# Patient Record
Sex: FEMALE | Race: WHITE | URBAN - METROPOLITAN AREA
[De-identification: names, ages, dates, MRNs, and addresses within clinical notes are randomized per-mention and may not be internally consistent; named-entity substitution may affect disease eponyms.]

---

## 2018-12-11 ENCOUNTER — OFFICE VISIT (OUTPATIENT)
Dept: URBAN - METROPOLITAN AREA CLINIC 33 | Facility: CLINIC | Age: 78
End: 2018-12-11
Payer: MEDICARE

## 2018-12-11 PROCEDURE — 99214 OFFICE O/P EST MOD 30 MIN: CPT | Performed by: OPHTHALMOLOGY

## 2018-12-11 PROCEDURE — 92134 CPTRZ OPH DX IMG PST SGM RTA: CPT | Performed by: OPHTHALMOLOGY

## 2018-12-11 ASSESSMENT — INTRAOCULAR PRESSURE
OD: 16
OS: 15

## 2018-12-11 NOTE — IMPRESSION/PLAN
Impression: Diagnosis: Primary open-angle glaucoma, bilateral, indeterminate stage. Code: G23.6664. Plan: Discussed diagnosis with patient. Recommend appt W/ Dr Waqas Osborn or  Dr Sheri Best. Continue gtts as prescribed.

## 2018-12-11 NOTE — IMPRESSION/PLAN
Impression: Tributary (branch) retinal vein occlusion, left eye, stable: O73.5937.
s/p Eylea Plan: OCT ordered and performed today. The patient returns today for an evaluation of Branch retinal vein occlusion. The vision remains stable exam and OCT test show no evidence of edema or neovascularization  at this time we will continue to observe the patient was advised to work closely with PCP to control blood pressure and lipids.

## 2018-12-28 ENCOUNTER — OFFICE VISIT (OUTPATIENT)
Dept: URBAN - METROPOLITAN AREA CLINIC 29 | Facility: CLINIC | Age: 78
End: 2018-12-28
Payer: MEDICARE

## 2018-12-28 DIAGNOSIS — H40.1134 PRIMARY OPEN-ANGLE GLAUCOMA, BILATERAL, INDETERMINATE STAGE: Primary | ICD-10-CM

## 2018-12-28 PROCEDURE — 76514 ECHO EXAM OF EYE THICKNESS: CPT | Performed by: OPHTHALMOLOGY

## 2018-12-28 PROCEDURE — 92133 CPTRZD OPH DX IMG PST SGM ON: CPT | Performed by: OPHTHALMOLOGY

## 2018-12-28 PROCEDURE — 92020 GONIOSCOPY: CPT | Performed by: OPHTHALMOLOGY

## 2018-12-28 PROCEDURE — 92014 COMPRE OPH EXAM EST PT 1/>: CPT | Performed by: OPHTHALMOLOGY

## 2018-12-28 RX ORDER — NETARSUDIL 0.2 MG/ML
0.02 % SOLUTION/ DROPS OPHTHALMIC; TOPICAL
Qty: 1 | Refills: 11 | Status: INACTIVE
Start: 2018-12-28 | End: 2019-01-29

## 2018-12-28 RX ORDER — BIMATOPROST 0.1 MG/ML
0.01 % SOLUTION/ DROPS OPHTHALMIC
Qty: 1 | Refills: 11 | Status: INACTIVE
Start: 2018-12-28 | End: 2018-12-28

## 2018-12-28 ASSESSMENT — INTRAOCULAR PRESSURE
OD: 23
OS: 18

## 2018-12-28 NOTE — IMPRESSION/PLAN
Impression: Primary open-angle glaucoma, bilateral, indeterminate stage: H40.1134. Plan: Discussed diagnosis, explained and understood by patient. Discussed IOP/ONH/Glaucoma management and risks. OCT ordered, performed and reviewed. Start Rhopressa qhs os, sample given. Discussed side effects of glaucoma meds. Continue dorzolamide bid ou, lumigan qhs ou, and brimonidine tid os. Will continue to monitor condition and symptoms.

## 2019-01-29 ENCOUNTER — OFFICE VISIT (OUTPATIENT)
Dept: URBAN - METROPOLITAN AREA CLINIC 29 | Facility: CLINIC | Age: 79
End: 2019-01-29
Payer: MEDICARE

## 2019-01-29 PROCEDURE — 92083 EXTENDED VISUAL FIELD XM: CPT | Performed by: OPHTHALMOLOGY

## 2019-01-29 PROCEDURE — 99213 OFFICE O/P EST LOW 20 MIN: CPT | Performed by: OPHTHALMOLOGY

## 2019-01-29 RX ORDER — DORZOLAMIDE HCL 20 MG/ML
2 % SOLUTION/ DROPS OPHTHALMIC
Qty: 0 | Refills: 0 | Status: INACTIVE
Start: 2019-01-29 | End: 2019-01-29

## 2019-01-29 RX ORDER — NETARSUDIL 0.2 MG/ML
0.02 % SOLUTION/ DROPS OPHTHALMIC; TOPICAL
Qty: 1 | Refills: 11 | Status: INACTIVE
Start: 2019-01-29 | End: 2019-02-12

## 2019-01-29 RX ORDER — BRIMONIDINE TARTRATE 2 MG/ML
0.2 % SOLUTION/ DROPS OPHTHALMIC
Qty: 1 | Refills: 11 | Status: INACTIVE
Start: 2019-01-29 | End: 2019-03-21

## 2019-01-29 RX ORDER — BIMATOPROST 0.1 MG/ML
0.01 % SOLUTION/ DROPS OPHTHALMIC
Refills: 0 | Status: INACTIVE
Start: 2019-01-29 | End: 2019-01-29

## 2019-01-29 RX ORDER — BIMATOPROST 0.1 MG/ML
0.01 % SOLUTION/ DROPS OPHTHALMIC
Qty: 1 | Refills: 11 | Status: INACTIVE
Start: 2019-01-29 | End: 2020-02-05

## 2019-01-29 RX ORDER — DORZOLAMIDE HCL 20 MG/ML
2 % SOLUTION/ DROPS OPHTHALMIC
Qty: 1 | Refills: 11 | Status: ACTIVE
Start: 2019-01-29

## 2019-01-29 ASSESSMENT — INTRAOCULAR PRESSURE
OD: 20
OS: 17

## 2019-01-29 NOTE — IMPRESSION/PLAN
Impression: Primary open-angle glaucoma, left eye, severe stage: Y10.1665. CCT average OU-ONH stable ou-
IOP borderline ou - Plan: Discussed diagnosis, explained and understood by patient. Discussed IOP/ONH/Glaucoma management and risks. Visual field ordered and reviewed today. Continue rhopressa qhs od, lumigan qhs ou, dorzolamide bid ou, brimonidine tid os. Will continue to monitor condition and symptoms.

## 2019-03-21 ENCOUNTER — OFFICE VISIT (OUTPATIENT)
Dept: URBAN - METROPOLITAN AREA CLINIC 29 | Facility: CLINIC | Age: 79
End: 2019-03-21
Payer: MEDICARE

## 2019-03-21 DIAGNOSIS — T15.11XA FOREIGN BODY IN CONJUNCTIVAL SAC, RIGHT EYE, INIT ENCNTR: ICD-10-CM

## 2019-03-21 DIAGNOSIS — H02.811 RETAINED FB IN RIGHT UPPER EYELID: Primary | ICD-10-CM

## 2019-03-21 PROCEDURE — 65205 REMOVE FOREIGN BODY FROM EYE: CPT | Performed by: OPTOMETRIST

## 2019-03-21 PROCEDURE — 99213 OFFICE O/P EST LOW 20 MIN: CPT | Performed by: OPTOMETRIST

## 2019-03-21 NOTE — IMPRESSION/PLAN
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 OD. Plan: Discussed diagnosis in detail with patient. Discussed treatment options with patient. Surgical treatment is required. Patient elects to have surgery. Will proceed with surgical treatment today.

## 2019-09-13 ENCOUNTER — HOSPITAL ENCOUNTER (OUTPATIENT)
Dept: ULTRASOUND IMAGING | Facility: OTHER | Age: 79
Discharge: HOME | End: 2019-09-13
Attending: NURSE PRACTITIONER | Admitting: FAMILY MEDICINE
Payer: MEDICARE

## 2019-09-13 DIAGNOSIS — I65.23 CAROTID ARTERY STENOSIS, ASYMPTOMATIC, BILATERAL: ICD-10-CM

## 2019-09-13 PROCEDURE — 93880 EXTRACRANIAL BILAT STUDY: CPT

## 2019-11-12 ENCOUNTER — OFFICE VISIT (OUTPATIENT)
Dept: URBAN - METROPOLITAN AREA CLINIC 29 | Facility: CLINIC | Age: 79
End: 2019-11-12
Payer: MEDICARE

## 2019-11-12 PROCEDURE — 92133 CPTRZD OPH DX IMG PST SGM ON: CPT | Performed by: OPHTHALMOLOGY

## 2019-11-12 PROCEDURE — 99213 OFFICE O/P EST LOW 20 MIN: CPT | Performed by: OPHTHALMOLOGY

## 2019-11-12 ASSESSMENT — INTRAOCULAR PRESSURE
OD: 14
OS: 14

## 2019-11-12 NOTE — IMPRESSION/PLAN
Impression: Primary open-angle glaucoma, left eye, severe stage: P78.9529. CCT average OU ONH stable ou
IOP borderline ou Plan: Discussed diagnosis, explained and understood by patient. Discussed IOP/ONH/Glaucoma management and risks. OCT ordered and reviewed today. Continue rhopressa qhs ou lumigan qhs ou, dorzolamide bid ou, brimonidine tid ou. Will continue to monitor condition and symptoms.

## 2019-12-17 ENCOUNTER — OFFICE VISIT (OUTPATIENT)
Dept: URBAN - METROPOLITAN AREA CLINIC 33 | Facility: CLINIC | Age: 79
End: 2019-12-17
Payer: MEDICARE

## 2019-12-17 DIAGNOSIS — H34.8322 TRIBUTARY (BRANCH) RETINAL VEIN OCCLUSION, LEFT EYE, STABLE: Primary | ICD-10-CM

## 2019-12-17 PROCEDURE — 92134 CPTRZ OPH DX IMG PST SGM RTA: CPT | Performed by: OPHTHALMOLOGY

## 2019-12-17 PROCEDURE — 99213 OFFICE O/P EST LOW 20 MIN: CPT | Performed by: OPHTHALMOLOGY

## 2019-12-17 ASSESSMENT — INTRAOCULAR PRESSURE
OS: 17
OD: 16

## 2019-12-17 NOTE — IMPRESSION/PLAN
Impression: Lila Steinberg (branch) retinal vein occlusion, left eye, stable: K11.1585.
s/p Eylea 09/2019 in [place] Plan: OCT ordered and performed today. The patient returns today for an evaluation of Branch retinal vein occlusion. The vision remains stable exam and OCT test show no evidence of edema or neovascularization  at this time we will continue to observe the patient was advised to work closely with PCP to control blood pressure and lipids.

## 2019-12-17 NOTE — IMPRESSION/PLAN
Impression: Primary open-angle glaucoma, left eye, severe stage: X53.2485. Plan: Discussed diagnosis in detail with patient. Discussed treatment options with patient. Continue gtts as directed by Dr. Anita Light.

## 2020-03-04 ENCOUNTER — TESTING ONLY (OUTPATIENT)
Dept: URBAN - METROPOLITAN AREA CLINIC 29 | Facility: CLINIC | Age: 80
End: 2020-03-04
Payer: MEDICARE

## 2020-03-04 PROCEDURE — 92083 EXTENDED VISUAL FIELD XM: CPT | Performed by: OPHTHALMOLOGY

## 2020-03-06 ENCOUNTER — OFFICE VISIT (OUTPATIENT)
Dept: URBAN - METROPOLITAN AREA CLINIC 29 | Facility: CLINIC | Age: 80
End: 2020-03-06
Payer: MEDICARE

## 2020-03-06 PROCEDURE — 99213 OFFICE O/P EST LOW 20 MIN: CPT | Performed by: OPHTHALMOLOGY

## 2020-03-06 RX ORDER — BIMATOPROST 0.1 MG/ML
0.01 % SOLUTION/ DROPS OPHTHALMIC
Qty: 5 | Refills: 11 | Status: INACTIVE
Start: 2020-03-06 | End: 2020-04-21

## 2020-03-06 ASSESSMENT — INTRAOCULAR PRESSURE
OD: 27
OS: 26

## 2020-03-06 NOTE — IMPRESSION/PLAN
Impression: Primary open-angle glaucoma, left eye, severe stage: V78.1074. CCT average OU ONH stable ou
IOP elevated OU (Pt stopped using lumigan) Plan: Discussed diagnosis, explained and understood by patient. Discussed IOP/ONH/Glaucoma management and risks. VF reviewed today. Continue rhopressa qhs ou dorzolamide bid ou, brimonidine tid ou. Restart lumigan qhs ou (IOP elevated OU, patient had stopped lumigan). Will continue to monitor condition and symptoms.

## 2020-03-31 ENCOUNTER — OFFICE VISIT (OUTPATIENT)
Dept: URBAN - METROPOLITAN AREA CLINIC 23 | Facility: CLINIC | Age: 80
End: 2020-03-31
Payer: MEDICARE

## 2020-03-31 PROCEDURE — 92012 INTRM OPH EXAM EST PATIENT: CPT | Performed by: OPHTHALMOLOGY

## 2020-03-31 ASSESSMENT — INTRAOCULAR PRESSURE
OS: 24
OD: 23

## 2020-03-31 NOTE — IMPRESSION/PLAN
Impression: Primary open-angle glaucoma, left eye, severe stage: L78.6853. BRVO OS Plan: Pt has Glaucoma    Gonio :  No gonio      Pachs:546/547      Today's IOP :  23, 24        // Tmax & date :27/38 Target IOP low to mid teens Pt denies Fhx of Glaucoma Right eye is the better seeing eye HVF (03/04/2020) OD: Non pattern defect OS: Inferior Nasal Step3/4/2020 OCT: (11/12/19) 78, 62 Pt denies Sulfa Allergy   // Pt denies Lung /Heart dx Pt is currently using : rhopressa qhs ou dorzolamide bid ou, brimonidine tid ou. Restart lumigan qhs ou No previous medications used  / Previously used medications : 
Plan :
1. Cont:
rhopressa qhs ou 
dorzolamide bid ou
brimonidine tid ou
lumigan qhs ou 2. Patient's IOP is slightly elevated today ; will continue to observe 3.  Return in 3 weeks for IOP check , if IOP is still elevated may warrant SLT OU

## 2020-03-31 NOTE — IMPRESSION/PLAN
Impression: Primary open-angle glaucoma, right eye, moderate stage: H40.1112.  Plan: See assessment #1

## 2020-04-21 ENCOUNTER — OFFICE VISIT (OUTPATIENT)
Dept: URBAN - METROPOLITAN AREA CLINIC 23 | Facility: CLINIC | Age: 80
End: 2020-04-21
Payer: MEDICARE

## 2020-04-21 PROCEDURE — 92014 COMPRE OPH EXAM EST PT 1/>: CPT | Performed by: OPHTHALMOLOGY

## 2020-04-21 RX ORDER — NETARSUDIL 0.2 MG/ML
0.02 % SOLUTION/ DROPS OPHTHALMIC; TOPICAL
Qty: 3 | Refills: 3 | Status: INACTIVE
Start: 2020-04-21 | End: 2021-05-12

## 2020-04-21 RX ORDER — BIMATOPROST 0.1 MG/ML
0.01 % SOLUTION/ DROPS OPHTHALMIC
Qty: 3 | Refills: 3 | Status: INACTIVE
Start: 2020-04-21 | End: 2021-01-06

## 2020-04-21 ASSESSMENT — INTRAOCULAR PRESSURE
OS: 24
OD: 20

## 2020-04-21 NOTE — IMPRESSION/PLAN
Impression: Primary open-angle glaucoma, left eye, severe stage: K20.9508. BRVO OS Plan: Pt has Glaucoma    Gonio :  No gonio      Pachs:546/547      Today's IOP :   20, 24      // Tmax & date :27/38 Target IOP low to mid teens Pt denies Fhx of Glaucoma Right eye is the better seeing eye HVF (03/04/2020) OD: Non pattern defect OS: Inferior Nasal Step3/4/2020 OCT: (11/12/19) 78, 62 Pt denies Sulfa Allergy   // Pt denies Lung /Heart dx Pt is currently using : rhopressa qhs ou dorzolamide bid ou, brimonidine tid ou. Restart lumigan qhs ou No previous medications used Plan :
1. Cont:
Rhopressa qhs ou 
dorzolamide bid ou
brimonidine tid ou Lumigan qhs ou 2. Patient's IOP is still elevated upon today's exam, recommend SLT OU in [place]. The patient is aware of the limitations of SLT , which can lower IOP 2-4mm for 6--12 months. The Patient is aware that SLT cannot improve the vision nor eliminate the need for the topical medications. If on any glaucoma medications, the patient is aware that SLT is not a replacement for the current medical regimen. 
3. Schedule SLT OD then OS in [place] and then return in the fall - October 2020

## 2020-07-07 ENCOUNTER — HOSPITAL ENCOUNTER (EMERGENCY)
Facility: OTHER | Age: 80
Discharge: HOME | End: 2020-07-07
Attending: EMERGENCY MEDICINE | Admitting: EMERGENCY MEDICINE
Payer: MEDICARE

## 2020-07-07 ENCOUNTER — APPOINTMENT (OUTPATIENT)
Dept: CT IMAGING | Facility: OTHER | Age: 80
End: 2020-07-07
Attending: EMERGENCY MEDICINE
Payer: MEDICARE

## 2020-07-07 VITALS
WEIGHT: 168 LBS | TEMPERATURE: 97.8 F | OXYGEN SATURATION: 98 % | DIASTOLIC BLOOD PRESSURE: 80 MMHG | RESPIRATION RATE: 16 BRPM | SYSTOLIC BLOOD PRESSURE: 195 MMHG

## 2020-07-07 DIAGNOSIS — I10 BENIGN ESSENTIAL HYPERTENSION: ICD-10-CM

## 2020-07-07 PROCEDURE — 99284 EMERGENCY DEPT VISIT MOD MDM: CPT | Mod: 25 | Performed by: EMERGENCY MEDICINE

## 2020-07-07 PROCEDURE — 93010 ELECTROCARDIOGRAM REPORT: CPT | Performed by: INTERNAL MEDICINE

## 2020-07-07 PROCEDURE — 99284 EMERGENCY DEPT VISIT MOD MDM: CPT | Mod: Z6 | Performed by: EMERGENCY MEDICINE

## 2020-07-07 PROCEDURE — 70450 CT HEAD/BRAIN W/O DYE: CPT

## 2020-07-07 PROCEDURE — 93005 ELECTROCARDIOGRAM TRACING: CPT | Performed by: EMERGENCY MEDICINE

## 2020-07-07 RX ORDER — NAPROXEN 500 MG/1
500 TABLET ORAL
COMMUNITY

## 2020-07-07 RX ORDER — ASPIRIN 325 MG
325 TABLET ORAL
COMMUNITY

## 2020-07-07 RX ORDER — METOPROLOL SUCCINATE 100 MG/1
TABLET, EXTENDED RELEASE ORAL
COMMUNITY
Start: 2019-11-23

## 2020-07-07 RX ORDER — LISINOPRIL 5 MG/1
5 TABLET ORAL DAILY
Qty: 14 TABLET | Refills: 0 | Status: SHIPPED | OUTPATIENT
Start: 2020-07-07

## 2020-07-07 RX ORDER — BRIMONIDINE TARTRATE 2 MG/ML
1 SOLUTION/ DROPS OPHTHALMIC
COMMUNITY

## 2020-07-07 RX ORDER — DORZOLAMIDE HCL 20 MG/ML
1 SOLUTION/ DROPS OPHTHALMIC
COMMUNITY

## 2020-07-07 RX ORDER — ALENDRONATE SODIUM 70 MG/1
TABLET ORAL
COMMUNITY
Start: 2020-06-30

## 2020-07-07 RX ORDER — LOSARTAN POTASSIUM 100 MG/1
TABLET ORAL
COMMUNITY
Start: 2019-12-26

## 2020-07-07 RX ORDER — PRAVASTATIN SODIUM 40 MG
TABLET ORAL
COMMUNITY
Start: 2019-09-23

## 2020-07-07 RX ORDER — LISINOPRIL 5 MG/1
5 TABLET ORAL DAILY
Qty: 14 TABLET | Refills: 0 | Status: SHIPPED | OUTPATIENT
Start: 2020-07-07 | End: 2020-07-07

## 2020-07-07 RX ORDER — GABAPENTIN 300 MG/1
1200 CAPSULE ORAL
COMMUNITY
Start: 2020-06-30

## 2020-07-07 RX ORDER — CHOLECALCIFEROL (VITAMIN D3) 50 MCG
2000 TABLET ORAL
COMMUNITY

## 2020-07-07 RX ORDER — TIMOLOL MALEATE/LATANOPROST/PF 0.5-0.005%
DROPS OPHTHALMIC (EYE)
COMMUNITY

## 2020-07-07 RX ORDER — LANOLIN ALCOHOL/MO/W.PET/CERES
3 CREAM (GRAM) TOPICAL
COMMUNITY

## 2020-07-07 RX ORDER — FUROSEMIDE 40 MG
40 TABLET ORAL
COMMUNITY

## 2020-07-07 RX ORDER — METFORMIN HCL 500 MG
TABLET, EXTENDED RELEASE 24 HR ORAL
COMMUNITY
Start: 2020-06-30

## 2020-07-07 RX ORDER — OMEPRAZOLE 40 MG/1
CAPSULE, DELAYED RELEASE ORAL
COMMUNITY
Start: 2019-08-17

## 2020-07-07 NOTE — ED TRIAGE NOTES
Pt states that yesterday and today she had a headache so she checked her BP and it was 180-190's systolic.  Pt was seen at the clinic and told her to come to the ED for imaging.  The clinic also had blood drawn on the pt.

## 2020-07-07 NOTE — ED PROVIDER NOTES
[facility]  Emergency Department Visit Note    Hypertension and Headache      History of Present Illness     HPI:  Coni Dietz is a 79 year old female presenting with hypertension. The patient has a history of hypertension but noted increased blood pressure that was concerning to her over the last 2 days. The patient has been compliant with all prescribed medications for hypertension. She has no chest pain, shortness of breath, abdominal pain, headache, blurry vision, diplopia, nausea, vomiting, dysuria, hematuria. She has had a mild frontal headache which is unusual and it prompted her to check her BP.  She went to her clinic and they recommended she go to the emergency department for imaging of her head.    Medications:  Prior to Admission medications    Medication Sig Last Dose Taking? Auth Provider   ACCU-CHEK JLUIS VI STRP 2 to 3 times a day or as directed 7/7/2020 at Unknown time Yes David Crocker MD   alendronate (FOSAMAX) 70 MG tablet TAKE 1 TABLET ONE TIME A WEEK. TAKE WITH PLAIN WATER IN THE MORNING. 7/7/2020 at Unknown time Yes Reported, Patient   aspirin (ASA) 325 MG tablet Take 325 mg by mouth 7/7/2020 at Unknown time Yes Reported, Patient   brimonidine (ALPHAGAN) 0.2 % ophthalmic solution 1 drop 7/7/2020 at Unknown time Yes Reported, Patient   dorzolamide (TRUSOPT) 2 % ophthalmic solution Inject 1 drop into the eye 7/7/2020 at Unknown time Yes Reported, Patient   FENOFIBRATE 134 MG OR CAPS 1 CAPSULE DAILY WITH FOOD 7/7/2020 at Unknown time Yes David Crocker MD   furosemide (LASIX) 40 MG tablet Take 40 mg by mouth 7/7/2020 at Unknown time Yes Reported, Patient   gabapentin (NEURONTIN) 300 MG capsule Take 1,200 mg by mouth 7/7/2020 at Unknown time Yes Reported, Patient   HYDROCHLOROTHIAZIDE 25 MG OR TABS 1 TAB PO twice weekly 7/7/2020 at Unknown time Yes David Crocker MD   HYZAAR 100-25 MG OR TABS 1 TABLET DAILY 7/7/2020 at Unknown time  Yes David Crocker MD   Latanoprost-Timolol Maleate 0.005-0.5 % SOLN  7/7/2020 at Unknown time Yes Reported, Patient   losartan (COZAAR) 100 MG tablet TAKE 1 TABLET EVERY DAY 7/7/2020 at Unknown time Yes Reported, Patient   magnesium 500 MG TABS Take 1 tablet by mouth 7/7/2020 at Unknown time Yes Reported, Patient   melatonin 3 MG tablet Take 3 mg by mouth 7/6/2020 at Unknown time Yes Reported, Patient   metFORMIN (GLUCOPHAGE-XR) 500 MG 24 hr tablet TAKE 1 TABLET EVERY MORNING WITH BREAKFAST. SWALLOW TABLET WHOLE; DO NOT CRUSH, DIVIDE OR CHEW. 7/7/2020 at Unknown time Yes Reported, Patient   metoprolol succinate ER (TOPROL-XL) 100 MG 24 hr tablet TAKE 1 TABLET EVERY DAY 7/7/2020 at Unknown time Yes Reported, Patient   naproxen (NAPROSYN) 500 MG tablet Take 500 mg by mouth 7/7/2020 at Unknown time Yes Reported, Patient   NAPROXEN 500 MG OR TABS 1 TABLET TWICE DAILY 7/7/2020 at Unknown time Yes David Crocker MD   netarsudil (RHOPRESSA) 0.02 % ophthalmic solution Inject 1 drop into the eye 7/7/2020 at Unknown time Yes Reported, Patient   NEURONTIN 300 MG OR CAPS 4-5 po hs 7/7/2020 at Unknown time Yes David Crocker MD   omeprazole (PRILOSEC) 40 MG DR capsule TAKE 1 CAPSULE EVERY MORNING BEFORE BREAKFAST.  DO NOT CRUSH. 7/7/2020 at Unknown time Yes Reported, Patient   pravastatin (PRAVACHOL) 40 MG tablet TAKE 1 TABLET EVERY DAY 7/7/2020 at Unknown time Yes Reported, Patient   TOPROL XL 50 MG OR TB24 1 daily 7/7/2020 at Unknown time Yes David Crocker MD   vitamin D3 (CHOLECALCIFEROL) 50 mcg (2000 units) tablet Take 2,000 Units by mouth 7/7/2020 at Unknown time Yes Reported, Patient       Allergies:  Allergies   Allergen Reactions     No Known Drug Allergies      Hydromorphone Nausea and Vomiting     Morphine Other (See Comments) and Anxiety     irritablity       Oxycodone-Acetaminophen      Other reaction(s): Behavioral Disturbances, Irritability  irritability         Problem  List:  Patient Active Problem List   Diagnosis     Essential hypertension, benign     Osteoporosis     Cervicalgia     Other specified menopausal and postmenopausal disorder     Sciatica     Mixed hyperlipidemia     Abnormal glucose       Past Medical History:  Past Medical History:   Diagnosis Date     Cervicalgia      Essential hypertension, benign      Hereditary progressive muscular dystrophy (H)     Fascioscapulohumeral muscular dystrophy     Other osteoporosis      Sciatica        Past Surgical History:  Past Surgical History:   Procedure Laterality Date     C NONSPECIFIC PROCEDURE      Breast reduction     C NONSPECIFIC PROCEDURE      Revision of scar on back     C REPAIR OF RECTOCELE       C VAGINAL HYSTERECTOMY       HC EXPLORE PARATHYROID GLANDS      hyperparathyroidism     HC SLING OPERATION FOR STRESS INCONTINENCE         Social History:  Social History     Tobacco Use     Smoking status: Never Smoker     Smokeless tobacco: Never Used   Substance Use Topics     Alcohol use: Yes     Comment: 3 drink weekly     Drug use: No       Review of Systems:  10 point review of systems obtained and pertinent positive and negative findings noted in HPI. Review of systems otherwise negative.      Physical Exam     Vital signs: BP (!) 195/80   Temp 97.8  F (36.6  C) (Tympanic)   Resp 16   Wt 76.2 kg (168 lb)   SpO2 98%     Physical Exam:  General: awake and alert, comfortable  HEENT: atraumatic, PERRL, fundi clearly visualized without hemorrhages, papilledema.  No scleral injection, no nasal discharge, neck supple  Chest: clear to auscultation bilaterally without wheezes or crackles, non labored respirations, symmetric chest rise  Cardiovascular: regular rate and rhythm, no murmurs or gallops  Abdomen: soft, nontender, no rebound or guarding, nondistended  Extremities: no deformities, edema, or tenderness  Skin: warm, dry, no rashes  Neuro: alert and oriented x 3, moving extremities x 4, ambulates without  difficulty    Medical Decision Making & ED Course     Coni Dietz is a 79 year old female presenting with hypertension. Differential includes hypertension, malignant hypertension, hypertensive emergency, hyperaldosteronism, congestive heart failure, acute coronary syndrome, acute renal failure, medication noncompliance, insufficient medication.  She was sent in from clinic to get a head scan which was done and negative.  Her BMP is also within normal limits and this was checked in the clinic.  Given the patient's lack of symptoms, this patient is likely suffering from hypertension without end-organ injury. Since acute management of hypertension has not been demonstrated to improve outcome, the patient does not require further emergency treatment of this hypertensive episode.  I will add lisinopril 5 mg daily in addition to taking all prescribed anti-hypertensive medications and scheduling close follow up with primary care provider. She is stable for further outpatient management. Patient given instructions on follow-up and warning signs for which to return to ED. All questions were answered and the patient is comfortable with plan for discharge. The patient was discharged in stable condition.      Diagnosis & Disposition     Diagnosis:  1. Benign essential hypertension        Disposition:  Home    MD Ifeoma Da Silva Theresa M, MD  07/07/20 [id number]

## 2020-07-07 NOTE — ED AVS SNAPSHOT
[facility]  [address]  [place] [address]  Phone:  [phone]  Fax:  [phone]                                    Coni Dietz   MRN: [id number]    Department:  [facility]   Date of Visit:  7/7/2020           After Visit Summary Signature Page    I have received my discharge instructions, and my questions have been answered. I have discussed any challenges I see with this plan with the nurse or doctor.    ..........................................................................................................................................  Patient/Patient Representative Signature      ..........................................................................................................................................  Patient Representative Print Name and Relationship to Patient    ..................................................               ................................................  Date                                   Time    ..........................................................................................................................................  Reviewed by Signature/Title    ...................................................              ..............................................  Date                                               Time          22EPIC Rev 08/18

## 2020-07-09 LAB — INTERPRETATION ECG - MUSE: NORMAL

## 2020-12-15 ENCOUNTER — OFFICE VISIT (OUTPATIENT)
Dept: URBAN - METROPOLITAN AREA CLINIC 29 | Facility: CLINIC | Age: 80
End: 2020-12-15
Payer: MEDICARE

## 2020-12-15 DIAGNOSIS — H40.1112 PRIMARY OPEN-ANGLE GLAUCOMA, RIGHT EYE, MODERATE STAGE: ICD-10-CM

## 2020-12-15 PROCEDURE — 92012 INTRM OPH EXAM EST PATIENT: CPT | Performed by: OPHTHALMOLOGY

## 2020-12-15 PROCEDURE — 92133 CPTRZD OPH DX IMG PST SGM ON: CPT | Performed by: OPHTHALMOLOGY

## 2020-12-15 ASSESSMENT — INTRAOCULAR PRESSURE
OS: 21
OD: 21

## 2020-12-15 NOTE — IMPRESSION/PLAN
Impression: Primary open-angle glaucoma, left eye, severe stage: J41.7797. BRVO OS, Dorzolomide & Brimonidine allergy  Plan: Pt has Glaucoma    Gonio :  No gonio      Pachs:546/547      Today's IOP :  21/ 21      // Tmax & date :27/38 Target IOP low to mid teens Pt denies Fhx of Glaucoma Right eye is the better seeing eye HVF (03/04/2020) OD: Non pattern defect OS: Inferior Nasal Step3/4/2020 OCT: 12/15/2020) 73/59 Pt denies Sulfa Allergy   // Pt denies Lung /Heart dx Pt is currently using : Rhopressa qhs ou ,dorzolamide bid ou, brimonidine tid ou, Lumigan qhs ou No previous medications used Plan :
1. Cont:
Rhopressa qhs ou 
Lumigan qhs ou Stop dorzolamide & brimonidine 2. Patient has signs of dorzolomide & Brimonidine typical reaction. Will stop the medications. 3. Reports she did have an SLT back in [place] this past summer 4. 2-3 week IOP after stopping medication

## 2021-01-06 ENCOUNTER — FOLLOW UP ESTABLISHED (OUTPATIENT)
Dept: URBAN - METROPOLITAN AREA CLINIC 30 | Facility: CLINIC | Age: 81
End: 2021-01-06
Payer: MEDICARE

## 2021-01-06 PROCEDURE — 92250 FUNDUS PHOTOGRAPHY W/I&R: CPT | Performed by: OPHTHALMOLOGY

## 2021-01-06 PROCEDURE — 99213 OFFICE O/P EST LOW 20 MIN: CPT | Performed by: OPHTHALMOLOGY

## 2021-01-06 ASSESSMENT — INTRAOCULAR PRESSURE
OD: 20
OS: 19

## 2021-04-06 ENCOUNTER — OFFICE VISIT (OUTPATIENT)
Dept: URBAN - METROPOLITAN AREA CLINIC 29 | Facility: CLINIC | Age: 81
End: 2021-04-06
Payer: MEDICARE

## 2021-04-06 PROCEDURE — 99213 OFFICE O/P EST LOW 20 MIN: CPT | Performed by: OPHTHALMOLOGY

## 2021-04-06 ASSESSMENT — INTRAOCULAR PRESSURE
OS: 15
OD: 15

## 2021-04-06 NOTE — IMPRESSION/PLAN
Impression: Primary open-angle glaucoma, left eye, severe stage: P12.2823. BRVO OS, Dorzolomide & Brimonidine allergy Plan: Pt has Glaucoma    Gonio :  No gonio      Pachs:546/547      Today's IOP :15/15   Tmax & date :27/38 Target IOP low to mid teens Pt denies Fhx of Glaucoma Right eye is the better seeing eye HVF (03/04/2020) OD: Non pattern defect OS: Inferior Nasal Step3/4/2020 OCT: 12/15/2020) 73/59 Pt denies Sulfa Allergy   // Pt denies Lung /Heart dx Plan :
1. Cont:
Rhopressa qhs ou 
Lumigan qhs ou 2. Reports she did have an SLT back in [place] this past summer 3. IOP and condition appear stable today. No changes being made to current regimen. Recommend monitoring condition at this time. 4.  Discussed details about Glaucoma and that without proper control of pressures irreversible blindness can occur. Patient understands risks. Emphasize compliance with drop and without compliance vision loss progression can occur.

## 2021-04-06 NOTE — IMPRESSION/PLAN
Impression: Meibomian gland dysfunction of eye: H02.889. Plan: Discussed diagnosis in detail with patient. Discussed treatment options with patient. recommend warm compressed and lid massage. Patient to do compresses BID and recommend a heat pack 5-10 minutes of treatment (Discouraged against wet compress) once completed patient to do lid scrubs. 2-3 drops of baby shampoo in warm water, with clean washcloth lightly scrub closed eye lids.

## 2021-11-16 ENCOUNTER — OFFICE VISIT (OUTPATIENT)
Dept: URBAN - METROPOLITAN AREA CLINIC 28 | Facility: CLINIC | Age: 81
End: 2021-11-16
Payer: MEDICARE

## 2021-11-16 DIAGNOSIS — H40.1123 PRIMARY OPEN-ANGLE GLAUCOMA, LEFT EYE, SEVERE STAGE: Primary | ICD-10-CM

## 2021-11-16 DIAGNOSIS — H02.889 MEIBOMIAN GLAND DYSFUNCTION OF EYE: ICD-10-CM

## 2021-11-16 PROCEDURE — 92083 EXTENDED VISUAL FIELD XM: CPT | Performed by: OPHTHALMOLOGY

## 2021-11-16 PROCEDURE — 99213 OFFICE O/P EST LOW 20 MIN: CPT | Performed by: OPHTHALMOLOGY

## 2021-11-16 PROCEDURE — 92133 CPTRZD OPH DX IMG PST SGM ON: CPT | Performed by: OPHTHALMOLOGY

## 2021-11-16 RX ORDER — BIMATOPROST 0.1 MG/ML
0.01 % SOLUTION/ DROPS OPHTHALMIC
Qty: 15 | Refills: 3 | Status: INACTIVE
Start: 2021-11-16 | End: 2021-11-16

## 2021-11-16 RX ORDER — BIMATOPROST 0.1 MG/ML
0.01 % SOLUTION/ DROPS OPHTHALMIC
Qty: 15 | Refills: 3 | Status: INACTIVE
Start: 2021-11-16 | End: 2021-12-30

## 2021-11-16 RX ORDER — NETARSUDIL 0.2 MG/ML
0.02 % SOLUTION/ DROPS OPHTHALMIC; TOPICAL
Qty: 5 | Refills: 1 | Status: INACTIVE
Start: 2021-11-16 | End: 2022-04-12

## 2021-11-16 ASSESSMENT — INTRAOCULAR PRESSURE
OS: 15
OD: 13

## 2021-11-16 NOTE — IMPRESSION/PLAN
Impression: Primary open-angle glaucoma, left eye, severe stage: K90.0262. BRVO OS, Dorzolomide & Brimonidine allergy Plan: Pt has Glaucoma    Gonio :  No gonio      Pachs:546/547      Today's IOP :13/15   Tmax & date :27/38 Target IOP low to mid teens Pt denies Fhx of Glaucoma Right eye is the better seeing eye HVF (03/04/2020) OD: Non pattern defect OS: Inferior Nasal Step3/4/2020 OCT: 11/16/21 70/58 Pt denies Sulfa Allergy   // Pt denies Lung /Heart dx Plan :
1. Cont:
Rhopressa qhs ou 
Lumigan qhs ou 2. Reports she did have an SLT back in [place] this past summer 3. IOP and condition appear stable today. No changes being made to current regimen. Recommend monitoring condition at this time. 4.  Discussed details about Glaucoma and that without proper control of pressures irreversible blindness can occur. Patient understands risks. Emphasize compliance with drop and without compliance vision loss progression can occur. 
5. f/u iop check 4 months with VF taped 24-2 (small new superior scotoma OU most likely 2/2 posits)

## 2022-03-28 ENCOUNTER — TRANSFERRED RECORDS (OUTPATIENT)
Dept: HEALTH INFORMATION MANAGEMENT | Facility: OTHER | Age: 82
End: 2022-03-28

## 2022-03-28 LAB — EJECTION FRACTION: NORMAL %

## 2022-04-01 ENCOUNTER — TRANSFERRED RECORDS (OUTPATIENT)
Dept: HEALTH INFORMATION MANAGEMENT | Facility: OTHER | Age: 82
End: 2022-04-01

## 2022-04-11 ENCOUNTER — TESTING ONLY (OUTPATIENT)
Dept: URBAN - METROPOLITAN AREA CLINIC 28 | Facility: CLINIC | Age: 82
End: 2022-04-11
Payer: MEDICARE

## 2022-04-11 DIAGNOSIS — H40.1123 PRIMARY OPEN-ANGLE GLAUCOMA, LEFT EYE, SEVERE STAGE: Primary | ICD-10-CM

## 2022-04-11 PROCEDURE — 92083 EXTENDED VISUAL FIELD XM: CPT | Performed by: OPHTHALMOLOGY

## 2022-04-12 ENCOUNTER — OFFICE VISIT (OUTPATIENT)
Dept: URBAN - METROPOLITAN AREA CLINIC 28 | Facility: CLINIC | Age: 82
End: 2022-04-12
Payer: MEDICARE

## 2022-04-12 DIAGNOSIS — H02.889 MEIBOMIAN GLAND DYSFUNCTION OF EYE: ICD-10-CM

## 2022-04-12 PROCEDURE — 99213 OFFICE O/P EST LOW 20 MIN: CPT | Performed by: OPHTHALMOLOGY

## 2022-04-12 PROCEDURE — 92083 EXTENDED VISUAL FIELD XM: CPT | Performed by: OPHTHALMOLOGY

## 2022-04-12 RX ORDER — NETARSUDIL 0.2 MG/ML
0.02 % SOLUTION/ DROPS OPHTHALMIC; TOPICAL
Qty: 7.5 | Refills: 3 | Status: ACTIVE
Start: 2022-04-12

## 2022-04-12 ASSESSMENT — INTRAOCULAR PRESSURE
OD: 19
OS: 19

## 2022-06-09 ENCOUNTER — HOSPITAL ENCOUNTER (EMERGENCY)
Facility: OTHER | Age: 82
Discharge: HOME | End: 2022-06-09
Attending: PHYSICIAN ASSISTANT
Payer: MEDICARE

## 2022-06-09 VITALS
RESPIRATION RATE: 18 BRPM | OXYGEN SATURATION: 97 % | WEIGHT: 170 LBS | DIASTOLIC BLOOD PRESSURE: 69 MMHG | HEART RATE: 70 BPM | TEMPERATURE: 96.6 F | SYSTOLIC BLOOD PRESSURE: 163 MMHG | HEIGHT: 66 IN | BODY MASS INDEX: 27.32 KG/M2

## 2022-06-09 NOTE — PROGRESS NOTES
Patient states that she thought she was at [facility]- she decided she did not want to be seen in the ER

## 2022-06-09 NOTE — ED TRIAGE NOTES
Patient states that she developed a cold last Saturday 6/4/22 and states that she tested negative for COVID on a home test, and has not been around anyone who is sick. She has had a poor appetite, low grad fevers, chills, no shortness of breath, no new chest pain, no nausea vomiting, or diarrhea. She has been taking pseudoephedrine and dayquil for symptoms relief.  She denies having any pain.

## 2023-01-20 ENCOUNTER — OFFICE VISIT (OUTPATIENT)
Dept: URBAN - METROPOLITAN AREA CLINIC 28 | Facility: CLINIC | Age: 83
End: 2023-01-20
Payer: MEDICARE

## 2023-01-20 DIAGNOSIS — H40.1123 PRIMARY OPEN-ANGLE GLAUCOMA, LEFT EYE, SEVERE STAGE: Primary | ICD-10-CM

## 2023-01-20 DIAGNOSIS — H40.1112 PRIMARY OPEN-ANGLE GLAUCOMA, RIGHT EYE, MODERATE STAGE: ICD-10-CM

## 2023-01-20 PROCEDURE — 92004 COMPRE OPH EXAM NEW PT 1/>: CPT | Performed by: OPTOMETRIST

## 2023-01-20 PROCEDURE — 92133 CPTRZD OPH DX IMG PST SGM ON: CPT | Performed by: OPTOMETRIST

## 2023-01-20 RX ORDER — DORZOLAMIDE HYDROCHLORIDE AND TIMOLOL MALEATE 20; 5 MG/ML; MG/ML
SOLUTION/ DROPS OPHTHALMIC
Qty: 10 | Refills: 5 | Status: ACTIVE
Start: 2023-01-20

## 2023-01-20 ASSESSMENT — INTRAOCULAR PRESSURE
OD: 16
OS: 15

## 2023-01-20 NOTE — IMPRESSION/PLAN
Impression: Primary open-angle glaucoma, left eye, severe stage: T38.6300.
- Reports she did have an SLT back in [place] this past summer BRVO OS, Dorzolomide & Brimonidine allergy Plan: Pt has Glaucoma Pachs: 546/547      Today's IOP: 16/15   Tmax: 27/38 Target IOP low to mid teens Pt denies Fhx of Glaucoma Right eye is the better seeing eye HVF: 4/12/22, OD: early inf nasal step, OS: inf nasal step with central encroachment OCT: 1/20/23, OD: 71/56 Pt denies Sulfa Allergy // Pt denies Lung /Heart dx Plan: 1. Continue:
Rhopressa qhs ou
Lumigan qhs ou
Dorzolamide/timolol qam ou 2. Pt is managed by Dr. Arlon Aschoff back home in [place]. IOP good today with addition of dorzol/timolol. Recommend monitoring condition at this time. 3. Discussed details about glaucoma and that without proper control of pressures irreversible blindness can occur. Patient understands risks. Emphasize compliance with drop and without compliance vision loss progression can occur. 4. Stressed importance of having regular glaucoma f/u throughout the year (here and at home in [place]). Monitor in the spring at home in [place] in the spring and here annually with IOP and RNFL OCT.

## 2023-01-31 ENCOUNTER — TRANSFERRED RECORDS (OUTPATIENT)
Dept: HEALTH INFORMATION MANAGEMENT | Facility: OTHER | Age: 83
End: 2023-01-31

## 2023-07-10 ENCOUNTER — HOSPITAL ENCOUNTER (EMERGENCY)
Facility: OTHER | Age: 83
Discharge: HOME | End: 2023-07-10
Attending: INTERNAL MEDICINE | Admitting: INTERNAL MEDICINE
Payer: MEDICARE

## 2023-07-10 VITALS
WEIGHT: 170 LBS | HEIGHT: 65 IN | TEMPERATURE: 98.5 F | BODY MASS INDEX: 28.32 KG/M2 | RESPIRATION RATE: 10 BRPM | SYSTOLIC BLOOD PRESSURE: 137 MMHG | OXYGEN SATURATION: 92 % | HEART RATE: 63 BPM | DIASTOLIC BLOOD PRESSURE: 58 MMHG

## 2023-07-10 DIAGNOSIS — E86.1 HYPOVOLEMIA: ICD-10-CM

## 2023-07-10 LAB
ALBUMIN SERPL BCG-MCNC: 4 G/DL (ref 3.5–5.2)
ALP SERPL-CCNC: 55 U/L (ref 35–104)
ALT SERPL W P-5'-P-CCNC: 25 U/L (ref 0–50)
ANION GAP SERPL CALCULATED.3IONS-SCNC: 11 MMOL/L (ref 7–15)
AST SERPL W P-5'-P-CCNC: 18 U/L (ref 0–45)
BASOPHILS # BLD AUTO: 0 10E3/UL (ref 0–0.2)
BASOPHILS NFR BLD AUTO: 0 %
BILIRUB SERPL-MCNC: 0.5 MG/DL
BUN SERPL-MCNC: 30.4 MG/DL (ref 8–23)
CALCIUM SERPL-MCNC: 9.2 MG/DL (ref 8.8–10.2)
CHLORIDE SERPL-SCNC: 93 MMOL/L (ref 98–107)
CREAT SERPL-MCNC: 1.34 MG/DL (ref 0.51–0.95)
CRP SERPL-MCNC: <3 MG/L
DEPRECATED HCO3 PLAS-SCNC: 25 MMOL/L (ref 22–29)
EOSINOPHIL # BLD AUTO: 0.1 10E3/UL (ref 0–0.7)
EOSINOPHIL NFR BLD AUTO: 1 %
ERYTHROCYTE [DISTWIDTH] IN BLOOD BY AUTOMATED COUNT: 13 % (ref 10–15)
GFR SERPL CREATININE-BSD FRML MDRD: 40 ML/MIN/1.73M2
GLUCOSE SERPL-MCNC: 131 MG/DL (ref 70–99)
HCT VFR BLD AUTO: 31.9 % (ref 35–47)
HGB BLD-MCNC: 11.1 G/DL (ref 11.7–15.7)
IMM GRANULOCYTES # BLD: 0 10E3/UL
IMM GRANULOCYTES NFR BLD: 0 %
LACTATE SERPL-SCNC: 1.1 MMOL/L (ref 0.7–2)
LYMPHOCYTES # BLD AUTO: 0.6 10E3/UL (ref 0.8–5.3)
LYMPHOCYTES NFR BLD AUTO: 8 %
MCH RBC QN AUTO: 31.9 PG (ref 26.5–33)
MCHC RBC AUTO-ENTMCNC: 34.8 G/DL (ref 31.5–36.5)
MCV RBC AUTO: 92 FL (ref 78–100)
MONOCYTES # BLD AUTO: 0.9 10E3/UL (ref 0–1.3)
MONOCYTES NFR BLD AUTO: 11 %
NEUTROPHILS # BLD AUTO: 6.6 10E3/UL (ref 1.6–8.3)
NEUTROPHILS NFR BLD AUTO: 80 %
NRBC # BLD AUTO: 0 10E3/UL
NRBC BLD AUTO-RTO: 0 /100
NT-PROBNP SERPL-MCNC: 1399 PG/ML (ref 0–1800)
PLATELET # BLD AUTO: 248 10E3/UL (ref 150–450)
POTASSIUM SERPL-SCNC: 4.4 MMOL/L (ref 3.4–5.3)
PROCALCITONIN SERPL IA-MCNC: 0.06 NG/ML
PROT SERPL-MCNC: 6.1 G/DL (ref 6.4–8.3)
RBC # BLD AUTO: 3.48 10E6/UL (ref 3.8–5.2)
SODIUM SERPL-SCNC: 129 MMOL/L (ref 136–145)
TSH SERPL DL<=0.005 MIU/L-ACNC: 1.87 UIU/ML (ref 0.3–4.2)
WBC # BLD AUTO: 8.3 10E3/UL (ref 4–11)

## 2023-07-10 PROCEDURE — 84145 PROCALCITONIN (PCT): CPT | Performed by: INTERNAL MEDICINE

## 2023-07-10 PROCEDURE — 96360 HYDRATION IV INFUSION INIT: CPT | Performed by: INTERNAL MEDICINE

## 2023-07-10 PROCEDURE — 99284 EMERGENCY DEPT VISIT MOD MDM: CPT | Performed by: INTERNAL MEDICINE

## 2023-07-10 PROCEDURE — 80053 COMPREHEN METABOLIC PANEL: CPT | Performed by: INTERNAL MEDICINE

## 2023-07-10 PROCEDURE — 83605 ASSAY OF LACTIC ACID: CPT | Performed by: INTERNAL MEDICINE

## 2023-07-10 PROCEDURE — 36415 COLL VENOUS BLD VENIPUNCTURE: CPT | Performed by: INTERNAL MEDICINE

## 2023-07-10 PROCEDURE — 99283 EMERGENCY DEPT VISIT LOW MDM: CPT | Mod: 25 | Performed by: INTERNAL MEDICINE

## 2023-07-10 PROCEDURE — 86140 C-REACTIVE PROTEIN: CPT | Performed by: INTERNAL MEDICINE

## 2023-07-10 PROCEDURE — 83880 ASSAY OF NATRIURETIC PEPTIDE: CPT | Performed by: INTERNAL MEDICINE

## 2023-07-10 PROCEDURE — 84443 ASSAY THYROID STIM HORMONE: CPT | Performed by: INTERNAL MEDICINE

## 2023-07-10 PROCEDURE — 258N000003 HC RX IP 258 OP 636: Performed by: INTERNAL MEDICINE

## 2023-07-10 PROCEDURE — 85004 AUTOMATED DIFF WBC COUNT: CPT | Performed by: INTERNAL MEDICINE

## 2023-07-10 PROCEDURE — 96361 HYDRATE IV INFUSION ADD-ON: CPT | Performed by: INTERNAL MEDICINE

## 2023-07-10 RX ADMIN — SODIUM CHLORIDE 1000 ML: 9 INJECTION, SOLUTION INTRAVENOUS at 19:16

## 2023-07-10 ASSESSMENT — ACTIVITIES OF DAILY LIVING (ADL)
ADLS_ACUITY_SCORE: 33
ADLS_ACUITY_SCORE: 37

## 2023-07-10 NOTE — ED PROVIDER NOTES
Emergency Department Provider Note  : 1940 Age: 82 year old Sex: female MRN: [id number]    Chief Complaint   Patient presents with     Generalized Weakness     Fatigue       Medical Decision Making / Assessment / Plan   82 year old female presenting with hypovolemia, low blood pressure    ED Course as of 07/10/23 2157   Mon Jul 10, 2023   1828 Patient evaluated.  Feeling very weak and tired, somewhat lethargic.  Worsening this morning.  Has been progressive over the last couple of months.  Having exertional shortness of breath, cold intolerance.  She was too weak to go to physical therapy.  Has had a wood tick bite but no known deer tick bites.  No rash.  Blood pressure generally runs high.    She is 83-91 systolic.  No witnessed melena or hematochezia.  Check lab work, IV fluid bolus ordered.    CBC shows white count of 8.3.  Hemoglobin 11.1.  Hematocrit 31.9.  Platelet count 248,000.  Lactic acid normal at 1.1.    Follow-up blood pressure has improved up to 126/56.    CRP is normal.  TSH normal.  BNP normal.  Procalcitonin 0.06    Sodium and chloride are low.  Creatinine is 1.34 glucose 131.  Total protein 6.1.  GFR 40.    Rechecked patient.  She is feeling much better.  Blood pressures are now within normal range.    Patient was ambulated around the emergency room and did quite well.  1 L IV fluids administered.  She would like to discharge home.  Encouraged adequate oral hydration.        A shared decision making model was used. Plan and all results were discussed  Time was taken to answer all questions. Patient and/or associated parties understood and were agreeable to treatment plan.  Strict return to Emergency Department precautions as well as appropriate follow up instructions were provided. The patient was discharged in stable condition.    New Prescriptions    No medications on file       Final diagnoses:   Hypovolemia       Mo Orellana MD  7/10/2023   Emergency  "Department    Subjective   Coni is a 82 year old female who presents at  5:42 PM with a couple month history of worsening generalized weakness, tiredness.  Feeling rather lethargic at times.  Symptoms have been worsening the past couple of months.   states that she is fallen a couple times in the past couple months as well.  She was having worsening symptoms this morning.  Blood pressures upon arrival to emergency room today were low, 83 systolic.    Denies any blood in the stools.  No black tarry stools.  No chemotherapy.  No fevers or chills.  No headache, no rash, no abdominal pain.  No urinary symptoms.  No chest pain.  Vomited about 1 week ago.  Has been having shortness of breath and cold intolerance.  She was feeling too weak to go to physical therapy to help work on weakness in the hips/leg weakness    Sees cardiology in [place].  She has history of heart failure, COPD, type 2 diabetes.    I have reviewed the Medications, Allergies, Past Medical and Surgical History, and Social History in the [employer] and with family.    Review of Systems:  Please see Subjective / HPI for pertinent positives and negatives. All other systems reviewed and found to be negative.      Objective     Patient Vitals for the past 24 hrs:   BP Temp Temp src Pulse Resp SpO2 Height Weight   07/10/23 2030 137/58 -- -- 63 10 92 % -- --   07/10/23 1945 135/55 -- -- 70 18 97 % -- --   07/10/23 1930 126/53 -- -- 69 21 96 % -- --   07/10/23 1915 126/56 -- -- 68 13 98 % -- --   07/10/23 1738 (!) 86/37 98.5  F (36.9  C) Tympanic 79 -- 96 % -- --   07/10/23 1540 91/47 -- -- -- -- -- -- --   07/10/23 1537 (!) 83/47 98  F (36.7  C) Temporal 77 16 99 % 1.651 m (5' 5\") 77.1 kg (170 lb)       Physical Exam:     General: Awake, alert, in no acute respiratory distress. + Pale and ill-appearing.  Head: Normocephalic, atraumatic.  Eyes: Conjugate gaze.  ENT: Moist membranes, external ear appears normal.   Chest/Respiratory: Equal chest " rise, clear bilaterally.  Cardiovascular: Peripheral pulses present, regular rate and rhythm.  Abdominal: Soft, non-distended, non-tender.  Extremities: No obvious deformity.  Neurological: GCS 15, moving all extremities without gross deficit.  Skin: Warm, + appears pale.  No rashes, lesions, few bruises on bilateral arms.  Psychiatric: Somewhat flat affect.    Procedures / Critical Care   Procedures    Aggregate Critical Care Time: None.     Orders Placed This Encounter   Procedures     Comprehensive metabolic panel     UA with Microscopic reflex to Culture     CRP inflammation     Procalcitonin     TSH Reflex GH     Nt probnp inpatient (BNP)     Lactic acid whole blood     CBC with platelets and differential     Peripheral IV catheter     CBC with platelets differential       RESULTS: As noted above.          Medical/Surgical History:  Past Medical History:   Diagnosis Date     Cervicalgia      Essential hypertension, benign      Hereditary progressive muscular dystrophy (H)     Fascioscapulohumeral muscular dystrophy     Other osteoporosis      Sciatica      Past Surgical History:   Procedure Laterality Date      SLING OPERATION FOR STRESS INCONTINENCE       ZZC NONSPECIFIC PROCEDURE      Breast reduction     ZZC NONSPECIFIC PROCEDURE      Revision of scar on back     ZZC REPAIR OF RECTOCELE       ZZC VAGINAL HYSTERECTOMY       ZZ EXPLORE PARATHYROID GLANDS      hyperparathyroidism       Medications:  No current facility-administered medications for this encounter.     Current Outpatient Medications   Medication     ACCU-CHEK JLUIS VI STRP     alendronate (FOSAMAX) 70 MG tablet     aspirin (ASA) 325 MG tablet     brimonidine (ALPHAGAN) 0.2 % ophthalmic solution     dorzolamide (TRUSOPT) 2 % ophthalmic solution     FENOFIBRATE 134 MG OR CAPS     furosemide (LASIX) 40 MG tablet     gabapentin (NEURONTIN) 300 MG capsule     HYDROCHLOROTHIAZIDE 25 MG OR TABS     HYZAAR 100-25 MG OR TABS     Latanoprost-Timolol  Maleate 0.005-0.5 % SOLN     lisinopril (ZESTRIL) 5 MG tablet     losartan (COZAAR) 100 MG tablet     magnesium 500 MG TABS     melatonin 3 MG tablet     metFORMIN (GLUCOPHAGE-XR) 500 MG 24 hr tablet     metoprolol succinate ER (TOPROL-XL) 100 MG 24 hr tablet     naproxen (NAPROSYN) 500 MG tablet     NAPROXEN 500 MG OR TABS     netarsudil (RHOPRESSA) 0.02 % ophthalmic solution     NEURONTIN 300 MG OR CAPS     omeprazole (PRILOSEC) 40 MG DR capsule     pravastatin (PRAVACHOL) 40 MG tablet     TOPROL XL 50 MG OR TB24     vitamin D3 (CHOLECALCIFEROL) 50 mcg (2000 units) tablet       Allergies:  No known drug allergy, Hydromorphone, Morphine, and Oxycodone-acetaminophen    Relevant labs, images, EKGs, Epic and outside hospital (if applicable) charts were reviewed. The findings, diagnosis, plan, and need for follow up were discussed with the patient/family. Nursing notes were reviewed.      Mo Orellana MD  07/10/23 4619

## 2023-07-11 NOTE — ED NOTES
Patient reports frequent falls and being tired all the time. A/o in the room, resting comfortably in the bed with family at bedside.

## 2023-07-11 NOTE — DISCHARGE INSTRUCTIONS
Continue to try maintain adequate oral hydration.     Return as needed for follow-up for new / worsening symptoms.

## 2023-09-13 ENCOUNTER — LAB REQUISITION (OUTPATIENT)
Dept: LAB | Facility: OTHER | Age: 83
End: 2023-09-13

## 2023-09-13 DIAGNOSIS — E87.1 HYPO-OSMOLALITY AND HYPONATREMIA: ICD-10-CM

## 2023-09-13 DIAGNOSIS — D64.9 ANEMIA, UNSPECIFIED: ICD-10-CM

## 2023-09-13 DIAGNOSIS — E83.42 HYPOMAGNESEMIA: ICD-10-CM

## 2023-09-13 LAB
ANION GAP SERPL CALCULATED.3IONS-SCNC: 13 MMOL/L (ref 7–15)
BASOPHILS # BLD AUTO: 0 10E3/UL (ref 0–0.2)
BASOPHILS NFR BLD AUTO: 0 %
BUN SERPL-MCNC: 39.4 MG/DL (ref 8–23)
CALCIUM SERPL-MCNC: 8.5 MG/DL (ref 8.8–10.2)
CHLORIDE SERPL-SCNC: 84 MMOL/L (ref 98–107)
CREAT SERPL-MCNC: 1.85 MG/DL (ref 0.51–0.95)
DEPRECATED HCO3 PLAS-SCNC: 24 MMOL/L (ref 22–29)
EGFRCR SERPLBLD CKD-EPI 2021: 27 ML/MIN/1.73M2
EOSINOPHIL # BLD AUTO: 0.1 10E3/UL (ref 0–0.7)
EOSINOPHIL NFR BLD AUTO: 1 %
ERYTHROCYTE [DISTWIDTH] IN BLOOD BY AUTOMATED COUNT: 12.7 % (ref 10–15)
GLUCOSE SERPL-MCNC: 168 MG/DL (ref 70–99)
HCT VFR BLD AUTO: 22.3 % (ref 35–47)
HGB BLD-MCNC: 7.7 G/DL (ref 11.7–15.7)
IMM GRANULOCYTES # BLD: 0.1 10E3/UL
IMM GRANULOCYTES NFR BLD: 1 %
LYMPHOCYTES # BLD AUTO: 0.6 10E3/UL (ref 0.8–5.3)
LYMPHOCYTES NFR BLD AUTO: 6 %
MAGNESIUM SERPL-MCNC: 2.2 MG/DL (ref 1.7–2.3)
MCH RBC QN AUTO: 31.7 PG (ref 26.5–33)
MCHC RBC AUTO-ENTMCNC: 34.5 G/DL (ref 31.5–36.5)
MCV RBC AUTO: 92 FL (ref 78–100)
MONOCYTES # BLD AUTO: 0.8 10E3/UL (ref 0–1.3)
MONOCYTES NFR BLD AUTO: 8 %
NEUTROPHILS # BLD AUTO: 8.2 10E3/UL (ref 1.6–8.3)
NEUTROPHILS NFR BLD AUTO: 84 %
NRBC # BLD AUTO: 0 10E3/UL
NRBC BLD AUTO-RTO: 0 /100
PLATELET # BLD AUTO: 311 10E3/UL (ref 150–450)
POTASSIUM SERPL-SCNC: 4.2 MMOL/L (ref 3.4–5.3)
RBC # BLD AUTO: 2.43 10E6/UL (ref 3.8–5.2)
SODIUM SERPL-SCNC: 121 MMOL/L (ref 136–145)
WBC # BLD AUTO: 9.8 10E3/UL (ref 4–11)

## 2023-09-13 PROCEDURE — 85041 AUTOMATED RBC COUNT: CPT | Performed by: NURSE PRACTITIONER

## 2023-09-13 PROCEDURE — 83735 ASSAY OF MAGNESIUM: CPT | Performed by: NURSE PRACTITIONER

## 2023-09-13 PROCEDURE — 82310 ASSAY OF CALCIUM: CPT | Performed by: NURSE PRACTITIONER

## 2023-09-18 ENCOUNTER — LAB REQUISITION (OUTPATIENT)
Dept: LAB | Facility: OTHER | Age: 83
End: 2023-09-18

## 2023-09-18 DIAGNOSIS — I95.9 HYPOTENSION, UNSPECIFIED: ICD-10-CM

## 2023-09-18 LAB
ANION GAP SERPL CALCULATED.3IONS-SCNC: 11 MMOL/L (ref 7–15)
BUN SERPL-MCNC: 18.2 MG/DL (ref 8–23)
CALCIUM SERPL-MCNC: 9.3 MG/DL (ref 8.8–10.2)
CHLORIDE SERPL-SCNC: 92 MMOL/L (ref 98–107)
CREAT SERPL-MCNC: 0.66 MG/DL (ref 0.51–0.95)
DEPRECATED HCO3 PLAS-SCNC: 22 MMOL/L (ref 22–29)
EGFRCR SERPLBLD CKD-EPI 2021: 87 ML/MIN/1.73M2
GLUCOSE SERPL-MCNC: 110 MG/DL (ref 70–99)
HBA1C MFR BLD: 5.8 % (ref 4–6.2)
POTASSIUM SERPL-SCNC: 5.5 MMOL/L (ref 3.4–5.3)
SODIUM SERPL-SCNC: 125 MMOL/L (ref 136–145)

## 2023-09-18 PROCEDURE — 83036 HEMOGLOBIN GLYCOSYLATED A1C: CPT | Performed by: NURSE PRACTITIONER

## 2023-09-18 PROCEDURE — 82435 ASSAY OF BLOOD CHLORIDE: CPT | Performed by: NURSE PRACTITIONER

## 2023-09-18 PROCEDURE — 82374 ASSAY BLOOD CARBON DIOXIDE: CPT | Performed by: NURSE PRACTITIONER

## 2023-11-15 ENCOUNTER — OFFICE VISIT (OUTPATIENT)
Dept: URBAN - METROPOLITAN AREA CLINIC 24 | Facility: CLINIC | Age: 83
End: 2023-11-15
Payer: MEDICARE

## 2023-11-15 DIAGNOSIS — H40.1123 PRIMARY OPEN-ANGLE GLAUCOMA, LEFT EYE, SEVERE STAGE: ICD-10-CM

## 2023-11-15 PROCEDURE — 99214 OFFICE O/P EST MOD 30 MIN: CPT | Performed by: OPHTHALMOLOGY

## 2023-11-15 PROCEDURE — 92133 CPTRZD OPH DX IMG PST SGM ON: CPT | Performed by: OPHTHALMOLOGY

## 2023-11-15 ASSESSMENT — KERATOMETRY
OS: 44.45
OD: 44.63

## 2023-11-30 RX ORDER — DORZOLAMIDE HYDROCHLORIDE AND TIMOLOL MALEATE 20; 5 MG/ML; MG/ML
SOLUTION/ DROPS OPHTHALMIC
Qty: 20 | Refills: 5 | Status: INACTIVE
Start: 2023-11-30 | End: 2024-01-30

## 2024-01-30 ENCOUNTER — OFFICE VISIT (OUTPATIENT)
Dept: URBAN - METROPOLITAN AREA CLINIC 24 | Facility: CLINIC | Age: 84
End: 2024-01-30
Payer: MEDICARE

## 2024-01-30 DIAGNOSIS — H40.1112 PRIMARY OPEN-ANGLE GLAUCOMA, RIGHT EYE, MODERATE STAGE: ICD-10-CM

## 2024-01-30 PROCEDURE — 99214 OFFICE O/P EST MOD 30 MIN: CPT | Performed by: OPHTHALMOLOGY

## 2024-01-30 PROCEDURE — 92133 CPTRZD OPH DX IMG PST SGM ON: CPT | Performed by: OPHTHALMOLOGY

## 2024-01-30 PROCEDURE — 92083 EXTENDED VISUAL FIELD XM: CPT | Performed by: OPHTHALMOLOGY

## 2024-01-30 RX ORDER — NETARSUDIL 0.2 MG/ML
0.02 % SOLUTION/ DROPS OPHTHALMIC; TOPICAL
Qty: 7.5 | Refills: 3 | Status: INACTIVE
Start: 2024-01-30 | End: 2024-02-16

## 2024-01-30 RX ORDER — DORZOLAMIDE HYDROCHLORIDE AND TIMOLOL MALEATE 20; 5 MG/ML; MG/ML
SOLUTION/ DROPS OPHTHALMIC
Qty: 20 | Refills: 5 | Status: INACTIVE
Start: 2024-01-30 | End: 2024-02-16

## 2024-01-30 RX ORDER — LATANOPROST 50 UG/ML
0.005 % SOLUTION OPHTHALMIC
Qty: 2.5 | Refills: 5 | Status: INACTIVE
Start: 2024-01-30 | End: 2024-02-16

## 2024-01-30 ASSESSMENT — INTRAOCULAR PRESSURE
OS: 18
OD: 18